# Patient Record
Sex: MALE | Race: OTHER | HISPANIC OR LATINO | RURAL
[De-identification: names, ages, dates, MRNs, and addresses within clinical notes are randomized per-mention and may not be internally consistent; named-entity substitution may affect disease eponyms.]

---

## 2023-09-25 ENCOUNTER — OFFICE VISIT (OUTPATIENT)
Dept: FAMILY MEDICINE | Facility: CLINIC | Age: 33
End: 2023-09-25

## 2023-09-25 VITALS
WEIGHT: 178.38 LBS | DIASTOLIC BLOOD PRESSURE: 94 MMHG | OXYGEN SATURATION: 97 % | TEMPERATURE: 99 F | BODY MASS INDEX: 29.72 KG/M2 | HEART RATE: 67 BPM | HEIGHT: 65 IN | SYSTOLIC BLOOD PRESSURE: 147 MMHG

## 2023-09-25 DIAGNOSIS — R03.0 ELEVATED BP WITHOUT DIAGNOSIS OF HYPERTENSION: ICD-10-CM

## 2023-09-25 DIAGNOSIS — K40.90 LEFT INGUINAL HERNIA: ICD-10-CM

## 2023-09-25 DIAGNOSIS — K60.2 ANAL FISSURE: Primary | ICD-10-CM

## 2023-09-25 PROCEDURE — 99214 PR OFFICE/OUTPT VISIT, EST, LEVL IV, 30-39 MIN: ICD-10-PCS | Mod: ,,, | Performed by: FAMILY MEDICINE

## 2023-09-25 PROCEDURE — 99214 OFFICE O/P EST MOD 30 MIN: CPT | Mod: ,,, | Performed by: FAMILY MEDICINE

## 2023-09-25 NOTE — PROGRESS NOTES
Services used for this encounter.     Kat Smith DO        PATIENT NAME: Donnie Rodriguez  : 1990  DATE: 23  MRN: [id number]      Reason for Visit / Chief Complaint: Rectal Bleeding (Patient noticed 3 days ago)    History of Present Illness / Problem Focused Workflow     Donnie Rodriguez presents to the clinic with Rectal Bleeding (Patient noticed 3 days ago)     Reports blood in his stools three days ago. Started with small amount of blood on the day, which increased on the second day and then decreased on the third day. Reports some pain associated with bowel movements, but denies any bleeding. Also reports associated itchiness in the region.   Has bowel movements twice a day and denies any straining with bowel movements   States that after the first day, he started using a OTC hemorrhoid cream which seems to help   Denies any previous history of hematochezia, melena, or hemorrhoids. Denies any FMH of colon polyps or colon cancer     His BP is elevated today. States he has not been diagnosed with high BP before and is not on any medications.   Denies any chest pain, abdominal pain, or headaches     Reports history of inguinal hernia which was noted 1 year ago. States she saw a doctor back then but denies any further intervention   States that he has started noticing occasional pain after long day of work. Reports that his job required him to carry heavy objects and would like to see a specialist to see what can be done for this.       Review of Systems     Review of Systems   Constitutional:  Negative for chills and fever.   Respiratory:  Negative for shortness of breath.    Gastrointestinal:  Positive for blood in stool and hematochezia. Negative for abdominal pain, nausea, vomiting and fecal incontinence.   Genitourinary:  Negative for difficulty urinating.   Neurological:  Negative for headaches.        Medical / Social / Family History   No past medical  history on file.    No past surgical history on file.    Social History  Mr. Donnie Rodriguez  reports that he has never smoked. He has never used smokeless tobacco. He reports that he does not drink alcohol and does not use drugs.    Family History  Mr. Donnie Rodriguez's family history is not on file.    Medications and Allergies     Medications  No outpatient medications have been marked as taking for the 9/25/23 encounter (Office Visit) with Kat Smith DO.       Allergies  Review of patient's allergies indicates:  No Known Allergies    Physical Examination     Vitals:    09/25/23 1619   BP: (!) 147/94   Pulse:    Temp:      Physical Exam  Constitutional:       General: He is not in acute distress.     Appearance: Normal appearance.   HENT:      Head: Normocephalic and atraumatic.   Cardiovascular:      Rate and Rhythm: Normal rate and regular rhythm.      Pulses: Normal pulses.      Heart sounds: No murmur heard.  Pulmonary:      Effort: Pulmonary effort is normal.      Breath sounds: Normal breath sounds. No wheezing, rhonchi or rales.   Abdominal:      General: Bowel sounds are normal.      Palpations: Abdomen is soft.      Tenderness: There is no abdominal tenderness.      Hernia: A hernia (Left inguinal, reducible) is present.   Genitourinary:     Rectum: Tenderness (Mild tenderness of left posterior anal wall) present. No mass, external hemorrhoid or internal hemorrhoid. Normal anal tone.      Comments: No external erythema or swelling noted.   Neurological:      General: No focal deficit present.      Mental Status: He is alert and oriented to person, place, and time.        Assessment and Plan (including Health Maintenance)   Plan:   Anal fissure  - No signs of masses or infection on exam today.   - Discussed conservative measures including avoiding constipation or straining as well as sitz bath and OTC analgesic cream for pain   - Continue to monitor for any bleeding and follow up  within 1 week     Left inguinal hernia  - No signs of incarceration on exam, easily reducible   - Will refer to Gen Surg for further evaluation     Elevated BP without diagnosis of HTN  - Elevated with SBPs in 130-140s.   - Follow up within 1 week for BP check and initiation of medication     Health Maintenance Due   Topic Date Due    Hepatitis C Screening  Never done    Lipid Panel  Never done    COVID-19 Vaccine (1) Never done    HIV Screening  Never done    TETANUS VACCINE  Never done    Influenza Vaccine (1) Never done       Problem List Items Addressed This Visit    None  Visit Diagnoses       Anal fissure    -  Primary    Left inguinal hernia        Relevant Orders    Ambulatory referral/consult to General Surgery    Elevated BP without diagnosis of hypertension              The patient has no Health Maintenance topics of status Not Due    Future Appointments   Date Time Provider Department Center   10/2/2023  3:00 PM Kat Smith DO [facility]        There are no Patient Instructions on file for this visit.    Follow up in about 1 week (around 10/2/2023) for Hematochezia follow up .     Signature:  Kat Smith DO      Date of encounter: 9/25/23

## 2023-09-26 PROBLEM — R03.0 ELEVATED BP WITHOUT DIAGNOSIS OF HYPERTENSION: Status: ACTIVE | Noted: 2023-09-26

## 2023-09-26 PROBLEM — K40.90 LEFT INGUINAL HERNIA: Status: ACTIVE | Noted: 2023-09-26

## 2023-10-02 ENCOUNTER — OFFICE VISIT (OUTPATIENT)
Dept: FAMILY MEDICINE | Facility: CLINIC | Age: 33
End: 2023-10-02

## 2023-10-02 VITALS
HEIGHT: 64 IN | HEART RATE: 77 BPM | SYSTOLIC BLOOD PRESSURE: 123 MMHG | TEMPERATURE: 98 F | WEIGHT: 177.38 LBS | OXYGEN SATURATION: 96 % | BODY MASS INDEX: 30.28 KG/M2 | DIASTOLIC BLOOD PRESSURE: 80 MMHG

## 2023-10-02 DIAGNOSIS — K60.2 ANAL FISSURE: Primary | ICD-10-CM

## 2023-10-02 DIAGNOSIS — K40.90 LEFT INGUINAL HERNIA: ICD-10-CM

## 2023-10-02 PROCEDURE — 99213 OFFICE O/P EST LOW 20 MIN: CPT | Mod: ,,, | Performed by: FAMILY MEDICINE

## 2023-10-02 PROCEDURE — 99213 PR OFFICE/OUTPT VISIT, EST, LEVL III, 20-29 MIN: ICD-10-PCS | Mod: ,,, | Performed by: FAMILY MEDICINE

## 2023-10-02 NOTE — PROGRESS NOTES
Kat Smith DO        PATIENT NAME: Donnie Rodriguez  : 1990  DATE: 10/2/23  MRN: [id number]      Reason for Visit / Chief Complaint: Follow-up (Hematochezia)     History of Present Illness / Problem Focused Workflow     Donnie Rodriguez presents to the clinic with Follow-up (Hematochezia)     Presents here for follow up for blood in his stool. Was previously diagnosed with anal fissure   Reports he is doing well today. Is not longer having any blood in his stool or any pain with bowel movements   Does not have any other concerns today     Was referred to General Surgery for the inguinal hernia. States he has not heard anything in regards to the referral.       Review of Systems     Review of Systems   Constitutional:  Negative for chills and fever.   Respiratory:  Negative for shortness of breath.    Cardiovascular:  Negative for chest pain.   Gastrointestinal:  Negative for abdominal pain, blood in stool and nausea.   Genitourinary:  Negative for difficulty urinating.        Medical / Social / Family History   No past medical history on file.    No past surgical history on file.    Social History  Mr. Donnie Rodriguez  reports that he has never smoked. He has never used smokeless tobacco. He reports that he does not drink alcohol and does not use drugs.    Family History  Mr. Donnie Rodriguez's family history is not on file.    Medications and Allergies     Medications  No outpatient medications have been marked as taking for the 10/2/23 encounter (Office Visit) with Kat Smith DO.       Allergies  Review of patient's allergies indicates:  No Known Allergies    Physical Examination     Vitals:    10/02/23 1519   BP: 123/80   Pulse: 77   Temp: 98 °F (36.7 °C)     Physical Exam  Constitutional:       General: He is not in acute distress.     Appearance: Normal appearance.   HENT:      Head: Normocephalic and atraumatic.      Nose: No congestion.    Cardiovascular:      Rate and Rhythm: Normal rate and regular rhythm.      Pulses: Normal pulses.      Heart sounds: No murmur heard.  Pulmonary:      Effort: Pulmonary effort is normal.      Breath sounds: Normal breath sounds. No wheezing, rhonchi or rales.   Abdominal:      General: Bowel sounds are normal.      Palpations: Abdomen is soft.      Tenderness: There is no abdominal tenderness.   Neurological:      Mental Status: He is alert.       Assessment and Plan (including Health Maintenance)     Plan:   Anal fissure  - Dicussed importance of avoiding constipation.    Left inguinal hernia  - Ambulatory referral/consult to General Surgery; Future; Expected date: 10/09/2023  - Advised patient to call in 2 weeks if he does not hear anything about the referral      Health Maintenance Due   Topic Date Due    Hepatitis C Screening  Never done    Lipid Panel  Never done    COVID-19 Vaccine (1) Never done    HIV Screening  Never done    TETANUS VACCINE  Never done    Influenza Vaccine (1) Never done       Problem List Items Addressed This Visit          GI    Left inguinal hernia    Relevant Orders    Ambulatory referral/consult to General Surgery     Other Visit Diagnoses       Anal fissure    -  Primary          Follow up as needed.     Signature:  Kat Smith DO      Date of encounter: 10/2/23